# Patient Record
(demographics unavailable — no encounter records)

---

## 2024-11-19 NOTE — DISCUSSION/SUMMARY
[FreeTextEntry1] : Patient is a 5-month-old baby girl who is here in our office for vaccination mother wants to get the IPV #2.  She is aware that child did not get Prevnar vaccine as well as HIB.  I explained that strep pneumonia and Hib can cause life-threatening infection including meningitis.  Mother is refusing vaccines that are not mandatory for recheck.  Mother signed the vaccine refusal form for these 2 vaccines.  I explained she can come back to get the inhaled these vaccines if she changes her mind.  Vaccine information sheet.  For Prevnar and HIB are provided to parents.  Child received hep B 0.5 mL IM injection on the left thigh.  [] : The components of the vaccine(s) to be administered today are listed in the plan of care. The disease(s) for which the vaccine(s) are intended to prevent and the risks have been discussed with the caretaker.  The risks are also included in the appropriate vaccination information statements which have been provided to the patient's caregiver.  The caregiver has given consent to vaccinate.

## 2024-11-19 NOTE — HISTORY OF PRESENT ILLNESS
[IPV] : IPV [FreeTextEntry1] : Mother is refusing hip and Prevnar vaccine.  She states that these vaccines are not required for pre-k.  She would like to continue with vaccination for vaccines that are required for pre-k.

## 2024-12-02 NOTE — REVIEW OF SYSTEMS
[Irritable] : irritability [Gaseous] : gaseous [Negative] : Genitourinary [FreeTextEntry1] : Not sleeping well at night

## 2024-12-02 NOTE — HISTORY OF PRESENT ILLNESS
[FreeTextEntry6] : mom is unsure if [place] is unwell or just teething. had a 99.7 this morning. has been waking up fussy last night, not going down for naps today and drooling. other than that, [place] seems to be in good spirits

## 2024-12-02 NOTE — DISCUSSION/SUMMARY
[FreeTextEntry1] : Patient is a 6-month-old baby girl who has been cranky and not sleeping at night.  Her father had mild viral symptoms including sore throat last week. she has been drooling a little bit.  She is very gassy and has bowel movement every 6 days which is formed but not hard. Examination significant for throat injection, normal tympanic on the left right ear canal is covered with dry cerumen.  Lungs are clear.  Abdomen is soft.  Skin is slightly dry with hypopigmented patches. Assessment: Viral syndrome, pharyngitis, impacted cerumen in the right ear, cranky and gassy baby. Plan: Supportive care, may start giving the baby sterile water, it may help with a bowel movement.  Tylenol as needed for fever or pain as child has sore throat. Use Debrox otic drop help with the cerumen in the right ear canal.  I discussed solid food yellow vegetables and some fruits with high fiber to help with the babies diet and bowel movement.. Return to office in 7 to 10 days for checkup and vaccination.  I discussed the benefits of RSV immunization. RTC as needed

## 2024-12-17 NOTE — PHYSICAL EXAM
[Alert] : alert [Normocephalic] : normocephalic [Flat Open Anterior [place]] : flat open anterior fontanelle [Red Reflex] : red reflex bilateral [PERRL] : PERRL [Normally Placed Ears] : normally placed ears [Auricles Well Formed] : auricles well formed [Clear Tympanic membranes] : clear tympanic membranes [Light reflex present] : light reflex present [Bony landmarks visible] : bony landmarks visible [Nares Patent] : nares patent [Palate Intact] : palate intact [Uvula Midline] : uvula midline [Supple, full passive range of motion] : supple, full passive range of motion [Symmetric Chest Rise] : symmetric chest rise [Clear to Auscultation Bilaterally] : clear to auscultation bilaterally [Regular Rate and Rhythm] : regular rate and rhythm [S1, S2 present] : S1, S2 present [+2 Femoral Pulses] : (+) 2 femoral pulses [Soft] : soft [Bowel Sounds] : bowel sounds present [Normal External Genitalia] : normal external genitalia [Normal Vaginal Introitus] : normal vaginal introitus [Patent] : patent [Normally Placed] : normally placed [No Abnormal Lymph Nodes Palpated] : no abnormal lymph nodes palpated [Symmetric Buttocks Creases] : symmetric buttocks creases [Plantar Grasp] : plantar grasp reflex present [Cranial Nerves Grossly Intact] : cranial nerves grossly intact [Acute Distress] : no acute distress [Discharge] : no discharge [Tooth Eruption] : no tooth eruption [Palpable Masses] : no palpable masses [Murmurs] : no murmurs [Tender] : nontender [Distended] : nondistended [Hepatomegaly] : no hepatomegaly [Splenomegaly] : no splenomegaly [Clitoromegaly] : no clitoromegaly [Hilliard-Ortolani] : negative Hilliard-Ortolani [Allis Sign] : negative Allis sign [Spinal Dimple] : no spinal dimple [Tuft of Hair] : no tuft of hair [Rash or Lesions] : no rash/lesions

## 2024-12-17 NOTE — DISCUSSION/SUMMARY
[Normal Growth] : growth [Normal Development] : development [None] : No medical problems [No Elimination Concerns] : elimination [No Feeding Concerns] : feeding [No Skin Concerns] : skin [Normal Sleep Pattern] : sleep [Family Functioning] : family functioning [Nutrition and Feeding] : nutrition and feeding [Infant Development] : infant development [Oral Health] : oral health [Safety] : safety [No Medications] : ~He/She~ is not on any medications [Parent/Guardian] : parent/guardian [] : The components of the vaccine(s) to be administered today are listed in the plan of care. The disease(s) for which the vaccine(s) are intended to prevent and the risks have been discussed with the caretaker.  The risks are also included in the appropriate vaccination information statements which have been provided to the patient's caregiver.  The caregiver has given consent to vaccinate. [FreeTextEntry1] : She wakes up at night several times due to being gassy, I recommended mom to stop nursing at night since baby is growing well and taking solids, she really does not need night feeding.  May use water at this age.  Parents agreed to give the first Hib vaccination today.  Child's examination is in normal range except low muscle tone, she does not pull to sit when you hold her hands.  She does not bear weight right away when being hold on her feet.  I offered the referral to physical therapist.  Parents wants to observe and decide about. Plan is to add oat cereal as a third solid food, and to cut down the night feeding. Return to office in 7 to 10 days for PCV 20 #1 vaccination.

## 2024-12-17 NOTE — DEVELOPMENTAL MILESTONES
[Normal Development] : Normal Development [None] : none [Begins to turn when name called] : begins to turn when name called [Babbles] : babbles [Rolls over prone to supine] : rolls over prone to supine [Sits briefly without support] : sits briefly without support [Reaches for object and transfers] : reaches for object and transfers [Passed] : passed [FreeTextEntry2] : 1

## 2024-12-24 NOTE — REVIEW OF SYSTEMS
[Irritable] : irritability [Nasal Congestion] : nasal congestion [Cough] : cough [Gaseous] : gaseous [Negative] : Genitourinary [FreeTextEntry1] : Not sleeping well at night

## 2024-12-24 NOTE — DISCUSSION/SUMMARY
[FreeTextEntry1] : Patient is a 6-month-old baby girl with mild nasal congestion and cough.  No fever, good p.o. intake. Father has been sick with viral cold symptoms a week ago.  Child's examination is unremarkable.  Parents are not interested in doing rapid test for viral infections at this point.  I encouraged him to continue the current care and supportive care with saline and suctioning the nose as needed.  Tylenol as needed for fever Return to office as needed..

## 2024-12-24 NOTE — HISTORY OF PRESENT ILLNESS
[FreeTextEntry6] : She has mild cough and congestion no fever in past days.  Her father had viral cold symptoms. Chills , congestion, cough, HA.

## 2025-01-06 NOTE — HISTORY OF PRESENT ILLNESS
[FreeTextEntry6] : BIB parents with c/o baby has not had a BM in 10 days; last BM was solid according to mom, but regular schedule was average every 5 days.  still has wet diapers, still passing gas. Currently breast fed and baby food; has tried prunes, pears and rectal temp to no avail.

## 2025-01-06 NOTE — DISCUSSION/SUMMARY
[FreeTextEntry1] : Anticipatory guidance and parent education given. Continue breast feeding and introducing new solids.   Avoid binding foods.  Recommend increased dietary fiber and probiotic. Advised using glycerin suppository.   Rectal stimulation as needed; leg exercises to help move gas.  Return if symptoms worsen or persist.

## 2025-01-06 NOTE — PHYSICAL EXAM
[Soft] : soft [Tender] : nontender [Distended] : nondistended [Normal Bowel Sounds] : normal bowel sounds [Patent] : patent [Anal Fissure] : no anal fissure [Erythema surrounding anus] : no erythema surrounding anus [NL] : warm, clear

## 2025-03-18 NOTE — DISCUSSION/SUMMARY
[Normal Growth] : growth [Normal Development] : development [None] : No known medical problems [No Elimination Concerns] : elimination [No Feeding Concerns] : feeding [Normal Sleep Pattern] : sleep [No Medications] : ~He/She~ is not on any medications [Parent/Guardian] : parent/guardian [] : The components of the vaccine(s) to be administered today are listed in the plan of care. The disease(s) for which the vaccine(s) are intended to prevent and the risks have been discussed with the caretaker.  The risks are also included in the appropriate vaccination information statements which have been provided to the patient's caregiver.  The caregiver has given consent to vaccinate. [Eczema] : eczema [Family Adaptation] : family adaptation [Infant [place]] : infant independence [Feeding Routine] : feeding routine [Safety] : safety [FreeTextEntry1] : Continue breast milk or formula as desired. Increase table foods, 3 meals with 2-3 snacks per day. Incorporate up to 6 oz of fluorinated water daily in a sippy cup. Discussed weaning of bottle and pacifier. Wipe teeth daily with washcloth. When in car, patient should be in rear-facing car seat in back seat. Put baby to sleep in own crib with no loose or soft bedding. Lower crib mattress. Help baby to maintain consistent daily routines and sleep schedule. Recognize stranger anxiety. Ensure home is safe since baby is increasingly mobile. Be within arm's reach of baby at all times. Use consistent, positive discipline. Avoid screen time. Read aloud to baby. Reviewed vaccine schedule with family. Requesting only 1 vaccine today. Will continue Aquaphor or Cerave to skin rash; may see allergist to r/o pet dander allergy.

## 2025-03-18 NOTE — DEVELOPMENTAL MILESTONES
[Normal Development] : Normal Development [None] : none [Uses basic gestures] : uses basic gestures [Says "Edgar" or "Mama"] : says "Edgar" or "Mama" nonspecifically [Sits well without support] : sits well without support [Transitions between sitting and lying] : transitions between sitting and lying [Balances on hands and knees] : balances on hands and knees [Picks up small objects with 3 fingers] : picks up small objects with 3 fingers and thumb [Releases objects intentionally] : releases objects intentionally [Dutton objects together] : bangs objects together [Crawls] : does not crawl

## 2025-03-18 NOTE — PHYSICAL EXAM
[Alert] : alert [Normocephalic] : normocephalic [Flat Open Anterior [place]] : flat open anterior fontanelle [Red Reflex] : red reflex bilateral [PERRL] : PERRL [Normally Placed Ears] : normally placed ears [Auricles Well Formed] : auricles well formed [Clear Tympanic membranes] : clear tympanic membranes [Light reflex present] : light reflex present [Bony landmarks visible] : bony landmarks visible [Nares Patent] : nares patent [Palate Intact] : palate intact [Uvula Midline] : uvula midline [Supple, full passive range of motion] : supple, full passive range of motion [Symmetric Chest Rise] : symmetric chest rise [Clear to Auscultation Bilaterally] : clear to auscultation bilaterally [Regular Rate and Rhythm] : regular rate and rhythm [S1, S2 present] : S1, S2 present [+2 Femoral Pulses] : (+) 2 femoral pulses [Soft] : soft [Bowel Sounds] : bowel sounds present [Normal External Genitalia] : normal external genitalia [Normal Vaginal Introitus] : normal vaginal introitus [No Abnormal Lymph Nodes Palpated] : no abnormal lymph nodes palpated [Symmetric abduction and rotation of hips] : symmetric abduction and rotation of hips [Straight] : straight [Cranial Nerves Grossly Intact] : cranial nerves grossly intact [Acute Distress] : no acute distress [Excessive Tearing] : no excessive tearing [Discharge] : no discharge [Palpable Masses] : no palpable masses [Murmurs] : no murmurs [Tender] : nontender [Distended] : nondistended [Hepatomegaly] : no hepatomegaly [Splenomegaly] : no splenomegaly [Clitoromegaly] : no clitoromegaly [Allis Sign] : negative Allis sign [de-identified] : raised dry erythematous patches to torso, back, axilla and b/l arms

## 2025-03-18 NOTE — HISTORY OF PRESENT ILLNESS
[Well-balanced] : well-balanced [Breast milk] : breast milk [Hours between feeds ___] : Child is fed every [unfilled] hours [Fruit] : fruit [Vegetables] : vegetables [Meat] : meat [Eggs] : eggs [Dairy] : dairy [Baby food] : baby food [Water] : water [Normal] : Normal [In Crib] : sleeps in crib [Sippy Cup use] : sippy cup use [No] : No cigarette smoke exposure [Water heater temperature set at <120 degrees F] : Water heater temperature set at <120 degrees F [Rear facing car seat in  back seat] : Rear facing car seat in  back seat [Carbon Monoxide Detectors] : Carbon monoxide detectors [Smoke Detectors] : Smoke detectors [Delayed] : delayed [Parents] : parents [FreeTextEntry1] : Parents report recent raised dry rash on torso and back. It started a few weeks ago; family went away on vacation and rash improved, but once home it returned. Parents concerned there may be an allergy to the pet dog.  Applying Aquaphor only.  No other new foods, contacts or irritants.

## 2025-04-25 NOTE — DISCUSSION/SUMMARY
[FreeTextEntry1] : Anticipatory guidance and parent education given. [] : The components of the vaccine(s) to be administered today are listed in the plan of care. The disease(s) for which the vaccine(s) are intended to prevent and the risks have been discussed with the caretaker.  The risks are also included in the appropriate vaccination information statements which have been provided to the patient's caregiver.  The caregiver has given consent to vaccinate.

## 2025-06-10 NOTE — PHYSICAL EXAM
[Alert] : alert [Normocephalic] : normocephalic [Closed Anterior [place]] : closed anterior fontanelle [Red Reflex] : red reflex bilateral [PERRL] : PERRL [Normally Placed Ears] : normally placed ears [Auricles Well Formed] : auricles well formed [Clear Tympanic membranes] : clear tympanic membranes [Light reflex present] : light reflex present [Bony landmarks visible] : bony landmarks visible [Palate Intact] : palate intact [Nares Patent] : nares patent [Uvula Midline] : uvula midline [Tooth Eruption] : tooth eruption [Supple, full passive range of motion] : supple, full passive range of motion [Clear to Auscultation Bilaterally] : clear to auscultation bilaterally [Symmetric Chest Rise] : symmetric chest rise [Regular Rate and Rhythm] : regular rate and rhythm [S1, S2 present] : S1, S2 present [+2 Femoral Pulses] : (+) 2 femoral pulses [Soft] : soft [Bowel Sounds] : normoactive bowel sounds [Normal External Genitalia] : normal external genitalia [Normal Vaginal Introitus] : normal vaginal introitus [No Abnormal Lymph Nodes Palpated] : no abnormal lymph nodes palpated [Symmetric Abduction and Rotation of Hips] : symmetric abduction and rotation of hips [Straight] : straight [Cranial Nerves Grossly Intact] : cranial nerves grossly intact [Discharge] : no discharge [Palpable Masses] : no palpable masses [Murmurs] : no murmurs [Tender] : nontender [Distended] : nondistended [Hepatomegaly] : no hepatomegaly [Splenomegaly] : no splenomegaly [Clitoromegaly] : no clitoromegaly [Allis Sign] : negative Allis sign [de-identified] : scaly erythematous patches to chest and arm

## 2025-06-10 NOTE — HISTORY OF PRESENT ILLNESS
[Parents] : parents [Breast milk] : breast milk [Cow's milk ___ oz/feed] : [unfilled] oz of Cow's milk per feed [Fruit] : fruit [Vegetables] : vegetables [Meat] : meat [Dairy] : dairy [Baby food] : baby food [Finger food] : finger food [Table food] : table food [___ stools every other day] : [unfilled]  stools every other day [___ stools per day] : [unfilled]  stools per day [___ voids per day] : [unfilled] voids per day [Normal] : Normal [Sippy cup use] : Sippy cup use [Brushing teeth] : Brushing teeth [Playtime] : Playtime  [Delayed] : Delayed [FreeTextEntry3] : 2 daytime naps, 45min-2hrs, bedtime 730pm, wakes during the night [de-identified] : reviewed vaccine schedule

## 2025-06-10 NOTE — DEVELOPMENTAL MILESTONES
[Normal Development] : Normal Development [Looks for hidden objects] : looks for hidden objects [Imitates new gestures] : imitates new gestures [Uses one word other than Mom or] : uses one word other than Mom or Dad or personal names [Says "Dad" or "Mom" with meaning] : says "Dad" or "Mom" with meaning [Follows a verbal command that] : follows a verbal command that includes a gesture [Drops object in a cup] : drops object in a cup [Picks up small object with 2 finger] : picks up small object with 2 finger pincer grasp [Picks up food and eats it] : picks up food and eats it [Yes: _______] : yes, [unfilled] [Yes] : Completed. [Takes first independent] : does not take first independent steps [Stands without support] : does not stand without support [FreeTextEntry1] : Parents report minimal floor time for play; encouraged floor time to master crawling, standing and pulling self to standing

## 2025-06-10 NOTE — DISCUSSION/SUMMARY
[Normal Growth] : growth [Normal Development] : development [None] : No known medical problems [No Elimination Concerns] : elimination [No Feeding Concerns] : feeding [Normal Sleep Pattern] : sleep [Eczema] : eczema [Family Support] : family support [Establishing Routines] : establishing routines [Feeding and Appetite Changes] : feeding and appetite changes [Establishing A Dental Home] : establishing a dental home [Safety] : safety [No Medications] : ~He/She~ is not on any medications [Parent/Guardian] : parent/guardian [] : The components of the vaccine(s) to be administered today are listed in the plan of care. The disease(s) for which the vaccine(s) are intended to prevent and the risks have been discussed with the caretaker.  The risks are also included in the appropriate vaccination information statements which have been provided to the patient's caregiver.  The caregiver has given consent to vaccinate. [FreeTextEntry1] : Transition to whole cow's milk. Continue table foods, 3 meals with 2-3 snacks per day. Incorporate up to 6 oz of fluorinated water daily in a sippy cup. Brush teeth twice a day with soft toothbrush. Recommend visit to dentist. When in car, keep child in rear-facing car seats until age 2, or until  the maximum height and weight for seat is reached. Put baby to sleep in own crib with no loose or soft bedding. Lower crib mattress. Help baby to maintain consistent daily routines and sleep schedule. Recognize stranger and separation anxiety. Ensure home is safe since baby is increasingly mobile. Be within arm's reach of baby at all times. Use consistent, positive discipline. Avoid screen time. Read aloud to baby.  Recommend daily moisturizer and topical steroid as needed. Side effect of topical steroids includes but not limited to lightening of skin. Avoid synthetic clothing. Bathe every 2-3 days, avoiding hot water.  Sleep with cool mist humidifier.  Reviewed vaccine schedule with family; requested 2 vaccines today and will return for remaining due.

## 2025-07-08 NOTE — DISCUSSION/SUMMARY
[FreeTextEntry1] : Does not go to , does not go in pools, went to a party 4th of July. Unknown if anyone was sick. Mom states she woke up today with a raspy voice, coughing here and there but not persistent. Sounds dry. No medication today OX:99% Exam is unremarkable. she is breathing comfortable occasionally has raspy voice when crying. Parents are instructed to do supportive care, Cool mist. Tylenol for fever as needed. I offered to treat patient with steroid for 3 days. Mother prefers to wait. if symptoms worsen, she will call back.

## 2025-07-08 NOTE — PHYSICAL EXAM
[Cerumen in canal] : cerumen in canal [Clear] : right tympanic membrane clear [NL] : left tympanic membrane clear, right tympanic membrane clear [de-identified] : mild throat injection

## 2025-07-08 NOTE — HISTORY OF PRESENT ILLNESS
[FreeTextEntry6] : Does not go to , does not go in pools, went to a party 4th of July. Unknown if anyone was sick. Mom states she woke up today with a raspy voice, coughing here and there but not persistent. Sounds dry. No medication today OX:99%